# Patient Record
Sex: FEMALE | Race: BLACK OR AFRICAN AMERICAN | NOT HISPANIC OR LATINO | URBAN - NONMETROPOLITAN AREA
[De-identification: names, ages, dates, MRNs, and addresses within clinical notes are randomized per-mention and may not be internally consistent; named-entity substitution may affect disease eponyms.]

---

## 2017-08-28 ENCOUNTER — OFFICE (OUTPATIENT)
Dept: URBAN - NONMETROPOLITAN AREA CLINIC 13 | Facility: CLINIC | Age: 72
End: 2017-08-28
Payer: COMMERCIAL

## 2017-08-28 ENCOUNTER — OFFICE (OUTPATIENT)
Dept: URBAN - NONMETROPOLITAN AREA CLINIC 13 | Facility: CLINIC | Age: 72
End: 2017-08-28
Payer: MEDICAID

## 2017-08-28 VITALS
HEART RATE: 77 BPM | SYSTOLIC BLOOD PRESSURE: 123 MMHG | HEIGHT: 60 IN | DIASTOLIC BLOOD PRESSURE: 68 MMHG | WEIGHT: 155 LBS

## 2017-08-28 VITALS — HEIGHT: 60 IN

## 2017-08-28 DIAGNOSIS — K21.9 GASTRO-ESOPHAGEAL REFLUX DISEASE WITHOUT ESOPHAGITIS: ICD-10-CM

## 2017-08-28 DIAGNOSIS — K52.9 NONINFECTIVE GASTROENTERITIS AND COLITIS, UNSPECIFIED: ICD-10-CM

## 2017-08-28 DIAGNOSIS — R12 HEARTBURN: ICD-10-CM

## 2017-08-28 DIAGNOSIS — Z80.0 FAMILY HISTORY OF MALIGNANT NEOPLASM OF DIGESTIVE ORGANS: ICD-10-CM

## 2017-08-28 DIAGNOSIS — Z01.812 ENCOUNTER FOR PREPROCEDURAL LABORATORY EXAMINATION: ICD-10-CM

## 2017-08-28 DIAGNOSIS — Z79.899 OTHER LONG TERM (CURRENT) DRUG THERAPY: ICD-10-CM

## 2017-08-28 LAB
CBC EMERALD: HEMATOCRIT: 39.2 % (ref 37–47)
CBC EMERALD: HEMOGLOBIN: 12 G/DL (ref 12–14)
CBC EMERALD: LYMPHS %: 42.2 % — HIGH (ref 20.5–40.5)
CBC EMERALD: LYMPHS ABSOLUTE: 2.4 10*3U/L (ref 1.3–2.9)
CBC EMERALD: MCH: 25.9 PG — LOW (ref 27–32)
CBC EMERALD: MCHC: 30.6 G/DL (ref 28.5–35)
CBC EMERALD: MCV: 84.5 FL (ref 84–100)
CBC EMERALD: MONOS %: 10.3 % — HIGH (ref 2–10)
CBC EMERALD: MONOS ABSOLUTE: 0.6 10*3U/L (ref 0.3–3.8)
CBC EMERALD: MPV: 9.6 FL (ref 7.4–10.4)
CBC EMERALD: NEUT. %: 47.5 % (ref 43–65)
CBC EMERALD: NEUT. ABSOLUTE: 2.8 10*3U/L (ref 2.2–4.8)
CBC EMERALD: PLATELETS: 233 10*3U/L (ref 130–440)
CBC EMERALD: RBC: 4.6 10*6U/L (ref 4.2–5.4)
CBC EMERALD: RDW: 14.8 % (ref 11.5–15.5)
CBC EMERALD: WBC: 5.8 10*3U/L (ref 4.5–10.5)

## 2017-08-28 PROCEDURE — 85025 COMPLETE CBC W/AUTO DIFF WBC: CPT

## 2017-08-28 PROCEDURE — 99213 OFFICE O/P EST LOW 20 MIN: CPT

## 2017-09-11 ENCOUNTER — OFFICE (OUTPATIENT)
Dept: URBAN - NONMETROPOLITAN AREA CLINIC 13 | Facility: CLINIC | Age: 72
End: 2017-09-11
Payer: COMMERCIAL

## 2017-09-11 ENCOUNTER — AMBULATORY SURGICAL CENTER (OUTPATIENT)
Dept: URBAN - NONMETROPOLITAN AREA SURGERY 2 | Facility: SURGERY | Age: 72
End: 2017-09-11
Payer: MEDICAID

## 2017-09-11 VITALS
HEART RATE: 76 BPM | HEART RATE: 64 BPM | SYSTOLIC BLOOD PRESSURE: 100 MMHG | SYSTOLIC BLOOD PRESSURE: 128 MMHG | HEART RATE: 68 BPM | DIASTOLIC BLOOD PRESSURE: 79 MMHG | HEART RATE: 79 BPM | OXYGEN SATURATION: 97 % | HEART RATE: 77 BPM | SYSTOLIC BLOOD PRESSURE: 133 MMHG | DIASTOLIC BLOOD PRESSURE: 59 MMHG | HEART RATE: 63 BPM | HEIGHT: 60 IN | SYSTOLIC BLOOD PRESSURE: 123 MMHG | OXYGEN SATURATION: 98 % | SYSTOLIC BLOOD PRESSURE: 140 MMHG | DIASTOLIC BLOOD PRESSURE: 81 MMHG | RESPIRATION RATE: 18 BRPM | DIASTOLIC BLOOD PRESSURE: 72 MMHG | HEART RATE: 78 BPM | SYSTOLIC BLOOD PRESSURE: 112 MMHG | RESPIRATION RATE: 20 BRPM | DIASTOLIC BLOOD PRESSURE: 76 MMHG | DIASTOLIC BLOOD PRESSURE: 58 MMHG | OXYGEN SATURATION: 100 % | SYSTOLIC BLOOD PRESSURE: 125 MMHG | DIASTOLIC BLOOD PRESSURE: 69 MMHG | DIASTOLIC BLOOD PRESSURE: 63 MMHG | WEIGHT: 155 LBS | OXYGEN SATURATION: 99 %

## 2017-09-11 DIAGNOSIS — K63.5 POLYP OF COLON: ICD-10-CM

## 2017-09-11 DIAGNOSIS — Z80.0 FAMILY HISTORY OF MALIGNANT NEOPLASM OF DIGESTIVE ORGANS: ICD-10-CM

## 2017-09-11 DIAGNOSIS — K62.1 RECTAL POLYP: ICD-10-CM

## 2017-09-11 PROBLEM — R15.9 INCONTINENCE OF FECES: Status: ACTIVE | Noted: 2017-09-11

## 2017-09-11 PROCEDURE — G8918 PT W/O PREOP ORDER IV AB PRO: HCPCS | Performed by: INTERNAL MEDICINE

## 2017-09-11 PROCEDURE — G9654 MON ANESTH CARE: HCPCS

## 2017-09-11 PROCEDURE — G8907 PT DOC NO EVENTS ON DISCHARG: HCPCS | Performed by: INTERNAL MEDICINE

## 2017-09-11 PROCEDURE — 88305 TISSUE EXAM BY PATHOLOGIST: CPT | Mod: TC | Performed by: INTERNAL MEDICINE

## 2017-09-11 PROCEDURE — 45385 COLONOSCOPY W/LESION REMOVAL: CPT | Mod: PT | Performed by: INTERNAL MEDICINE

## 2017-09-11 RX ORDER — CHOLESTYRAMINE 4 G/9G
POWDER, FOR SUSPENSION ORAL
Qty: 1 | Refills: 5 | Status: ACTIVE
Start: 2017-09-11

## 2017-09-11 RX ORDER — METRONIDAZOLE 500 MG/1
TABLET ORAL
Qty: 21 | Refills: 0 | Status: COMPLETED
Start: 2017-09-11 | End: 2018-10-11

## 2017-09-11 RX ORDER — DICYCLOMINE HYDROCHLORIDE 10 MG/1
CAPSULE ORAL
Qty: 360 | Refills: 1 | Status: COMPLETED
End: 2018-10-11

## 2017-09-13 LAB — SURGICAL PROCEDURE: PDF REPORT: (no result)

## 2018-10-11 ENCOUNTER — OFFICE (OUTPATIENT)
Dept: URBAN - NONMETROPOLITAN AREA CLINIC 13 | Facility: CLINIC | Age: 73
End: 2018-10-11
Payer: COMMERCIAL

## 2018-10-11 ENCOUNTER — OFFICE (OUTPATIENT)
Dept: URBAN - NONMETROPOLITAN AREA CLINIC 13 | Facility: CLINIC | Age: 73
End: 2018-10-11
Payer: MEDICAID

## 2018-10-11 VITALS — HEIGHT: 60 IN

## 2018-10-11 VITALS
DIASTOLIC BLOOD PRESSURE: 96 MMHG | DIASTOLIC BLOOD PRESSURE: 90 MMHG | SYSTOLIC BLOOD PRESSURE: 169 MMHG | WEIGHT: 163 LBS | HEART RATE: 53 BPM | OXYGEN SATURATION: 95 % | HEIGHT: 60 IN | SYSTOLIC BLOOD PRESSURE: 175 MMHG

## 2018-10-11 DIAGNOSIS — R10.84 GENERALIZED ABDOMINAL PAIN: ICD-10-CM

## 2018-10-11 DIAGNOSIS — K52.9 NONINFECTIVE GASTROENTERITIS AND COLITIS, UNSPECIFIED: ICD-10-CM

## 2018-10-11 DIAGNOSIS — D50.9 IRON DEFICIENCY ANEMIA, UNSPECIFIED: ICD-10-CM

## 2018-10-11 DIAGNOSIS — K21.9 GASTRO-ESOPHAGEAL REFLUX DISEASE WITHOUT ESOPHAGITIS: ICD-10-CM

## 2018-10-11 DIAGNOSIS — R94.5 ABNORMAL RESULTS OF LIVER FUNCTION STUDIES: ICD-10-CM

## 2018-10-11 DIAGNOSIS — Z79.899 OTHER LONG TERM (CURRENT) DRUG THERAPY: ICD-10-CM

## 2018-10-11 LAB
AMYLASE: 177.3 U/L — HIGH (ref 25–125)
CBC SYSMEX: HEMATOCRIT: 36.8 % — LOW (ref 37–47)
CBC SYSMEX: HEMOGLOBIN: 11.7 G/DL — LOW (ref 12–14)
CBC SYSMEX: LYMPHS %: 38.3 % (ref 20.5–40.5)
CBC SYSMEX: LYMPHS ABSOLUTE: 2.5 10*3U/L (ref 1.3–2.9)
CBC SYSMEX: MCH: 26.7 PG — LOW (ref 27–32)
CBC SYSMEX: MCHC: 31.8 G/DL (ref 28.5–35)
CBC SYSMEX: MCV: 84 FL (ref 84–100)
CBC SYSMEX: MONOS %: 11.7 % — HIGH (ref 2–10)
CBC SYSMEX: MONOS ABSOLUTE: 0.8 10*3U/L (ref 0.3–3.8)
CBC SYSMEX: MPV: 9.6 FL (ref 8.3–11.9)
CBC SYSMEX: NEUT. %: 50 % (ref 43–67)
CBC SYSMEX: NEUT. ABSOLUTE: 3.2 10*3U/L (ref 2.2–4.8)
CBC SYSMEX: PLATELETS: 232 10*3U/L (ref 130–440)
CBC SYSMEX: RBC: 4.4 10*6U/L (ref 4.2–5.4)
CBC SYSMEX: RDW: 14.1 % (ref 11.5–15.5)
CBC SYSMEX: WBC: 6.5 10*3U/L (ref 4.5–10.5)
COMPLETE METABOLIC: ALBUMIN: 3.6 G/DL (ref 3.5–5.2)
COMPLETE METABOLIC: ALK. PHOS: 140 U/L (ref 40–150)
COMPLETE METABOLIC: ALT: 20 U/L (ref 0–55)
COMPLETE METABOLIC: AST: 54 U/L — HIGH (ref 5–34)
COMPLETE METABOLIC: BUN: 12 MG/DL (ref 7–26)
COMPLETE METABOLIC: CALCIUM: 9.2 MG/DL (ref 8.4–10.3)
COMPLETE METABOLIC: CHLORIDE: 104 MMOL/L (ref 98–107)
COMPLETE METABOLIC: CO2: 28 MEQ/L (ref 22–29)
COMPLETE METABOLIC: CREATININE: 0.71 MG/DL (ref 0.57–1.25)
COMPLETE METABOLIC: GFR - AFRICAN AMERICAN: 103.4 (ref 59–200)
COMPLETE METABOLIC: GFR - NON AFRICAN AMERICAN: 85.3 (ref 59–200)
COMPLETE METABOLIC: GLUCOSE: 85 MG/DL (ref 70–99)
COMPLETE METABOLIC: POTASSIUM: 4.1 MMOL/L (ref 3.5–5.3)
COMPLETE METABOLIC: SODIUM: 140 MMOL/L (ref 136–145)
COMPLETE METABOLIC: TOTAL BILIRUBIN: 0.4 MG/DL (ref 0.2–1.2)
COMPLETE METABOLIC: TOTAL PROTEIN: 6.3 G/DL — LOW (ref 6.4–8.3)
LIPASE: 120 U/L — HIGH (ref 8–78)

## 2018-10-11 PROCEDURE — 83690 ASSAY OF LIPASE: CPT

## 2018-10-11 PROCEDURE — 85025 COMPLETE CBC W/AUTO DIFF WBC: CPT

## 2018-10-11 PROCEDURE — 82150 ASSAY OF AMYLASE: CPT

## 2018-10-11 PROCEDURE — 99213 OFFICE O/P EST LOW 20 MIN: CPT

## 2018-10-11 PROCEDURE — 80053 COMPREHEN METABOLIC PANEL: CPT

## 2018-10-11 PROCEDURE — 76700 US EXAM ABDOM COMPLETE: CPT | Mod: TC

## 2018-11-09 ENCOUNTER — OFFICE (OUTPATIENT)
Dept: URBAN - NONMETROPOLITAN AREA CLINIC 13 | Facility: CLINIC | Age: 73
End: 2018-11-09
Payer: COMMERCIAL

## 2018-11-09 VITALS — HEIGHT: 60 IN

## 2018-11-09 DIAGNOSIS — K62.5 HEMORRHAGE OF ANUS AND RECTUM: ICD-10-CM

## 2018-11-09 PROCEDURE — 85025 COMPLETE CBC W/AUTO DIFF WBC: CPT

## 2019-07-19 NOTE — SERVICENOTES
CBC CMP AMYLASE LIPASE W Plasty Text: The lesion was extirpated to the level of the fat with a #15 scalpel blade.  Given the location of the defect, shape of the defect and the proximity to free margins a W-plasty was deemed most appropriate for repair.  Using a sterile surgical marker, the appropriate transposition arms of the W-plasty were drawn incorporating the defect and placing the expected incisions within the relaxed skin tension lines where possible.    The area thus outlined was incised deep to adipose tissue with a #15 scalpel blade.  The skin margins were undermined to an appropriate distance in all directions utilizing iris scissors.  The opposing transposition arms were then transposed into place in opposite direction and anchored with interrupted buried subcutaneous sutures.